# Patient Record
Sex: MALE | Race: BLACK OR AFRICAN AMERICAN | NOT HISPANIC OR LATINO | Employment: FULL TIME | URBAN - METROPOLITAN AREA
[De-identification: names, ages, dates, MRNs, and addresses within clinical notes are randomized per-mention and may not be internally consistent; named-entity substitution may affect disease eponyms.]

---

## 2020-06-24 ENCOUNTER — APPOINTMENT (OUTPATIENT)
Dept: RADIOLOGY | Facility: MEDICAL CENTER | Age: 25
End: 2020-06-24
Attending: EMERGENCY MEDICINE
Payer: COMMERCIAL

## 2020-06-24 ENCOUNTER — HOSPITAL ENCOUNTER (EMERGENCY)
Facility: MEDICAL CENTER | Age: 25
End: 2020-06-24
Attending: EMERGENCY MEDICINE
Payer: COMMERCIAL

## 2020-06-24 VITALS
OXYGEN SATURATION: 97 % | BODY MASS INDEX: 21.98 KG/M2 | RESPIRATION RATE: 19 BRPM | SYSTOLIC BLOOD PRESSURE: 116 MMHG | WEIGHT: 145 LBS | DIASTOLIC BLOOD PRESSURE: 70 MMHG | TEMPERATURE: 99.1 F | HEART RATE: 71 BPM | HEIGHT: 68 IN

## 2020-06-24 DIAGNOSIS — T18.9XXA SWALLOWED FOREIGN BODY, INITIAL ENCOUNTER: ICD-10-CM

## 2020-06-24 PROCEDURE — 71045 X-RAY EXAM CHEST 1 VIEW: CPT

## 2020-06-24 PROCEDURE — 74019 RADEX ABDOMEN 2 VIEWS: CPT

## 2020-06-24 PROCEDURE — 74176 CT ABD & PELVIS W/O CONTRAST: CPT

## 2020-06-24 PROCEDURE — 700101 HCHG RX REV CODE 250: Performed by: EMERGENCY MEDICINE

## 2020-06-24 PROCEDURE — 99284 EMERGENCY DEPT VISIT MOD MDM: CPT

## 2020-06-24 RX ADMIN — POLYETHYLENE GLYCOL 3350, SODIUM SULFATE ANHYDROUS, SODIUM BICARBONATE, SODIUM CHLORIDE, POTASSIUM CHLORIDE 4 L: 236; 22.74; 6.74; 5.86; 2.97 POWDER, FOR SOLUTION ORAL at 20:15

## 2020-06-24 ASSESSMENT — LIFESTYLE VARIABLES: DO YOU DRINK ALCOHOL: NO

## 2020-06-24 NOTE — ED TRIAGE NOTES
Brought in from [facility] after swallowing 2 razor blades around 1400. Pt states had thought of harming himself when he did it - no thoughts prior or after swallowing disposable  razor blades. Pt denies any pain, no n/v. Pt appears in no distress.

## 2020-06-25 NOTE — ED PROVIDER NOTES
ED Provider Note    CHIEF COMPLAINT  Chief Complaint   Patient presents with   • Swallowed Foreign Body       HPI  Karli Hall is a 25 y.o. male who presents to the emergency department via EMS with guards stating he is in retirement, he swallowed to razor blades coat and plastic wrapped in toilet paper.  X-rays in the facility there did reveal evidence of the major bleeds in the gastrum.  The patient has fever, shakes, chills, sweats, nausea, vomiting, rectal bleeding or abdominal pain.  REVIEW OF SYSTEMS  Positives as above. Pertinent negatives include all pain, rectal bleeding all other review of systems are negative    PAST MEDICAL HISTORY  History reviewed. No pertinent past medical history.    FAMILY HISTORY  Noncontributory    SOCIAL HISTORY  Social History     Socioeconomic History   • Marital status: Single     Spouse name: Not on file   • Number of children: Not on file   • Years of education: Not on file   • Highest education level: Not on file   Occupational History   • Not on file   Social Needs   • Financial resource strain: Not on file   • Food insecurity     Worry: Not on file     Inability: Not on file   • Transportation needs     Medical: Not on file     Non-medical: Not on file   Tobacco Use   • Smoking status: Unknown If Ever Smoked   Substance and Sexual Activity   • Alcohol use: Not Currently   • Drug use: Not Currently   • Sexual activity: Not on file   Lifestyle   • Physical activity     Days per week: Not on file     Minutes per session: Not on file   • Stress: Not on file   Relationships   • Social connections     Talks on phone: Not on file     Gets together: Not on file     Attends [employer] service: Not on file     Active member of club or organization: Not on file     Attends meetings of clubs or organizations: Not on file     Relationship status: Not on file   • Intimate partner violence     Fear of current or ex partner: Not on file     Emotionally abused: Not on file     Physically  "abused: Not on file     Forced sexual activity: Not on file   Other Topics Concern   • Not on file   Social History Narrative   • Not on file       SURGICAL HISTORY  History reviewed. No pertinent surgical history.    CURRENT MEDICATIONS  Home Medications     Reviewed by Gabriela López R.N. (Registered Nurse) on 06/24/20 at 1639  Med List Status: <None>   Medication Last Dose Status        Patient Kunal Taking any Medications                       ALLERGIES  Not on File    PHYSICAL EXAM  VITAL SIGNS: /67   Pulse 62   Temp 37.3 °C (99.1 °F) (Temporal)   Resp 18   Ht 1.727 m (5' 8\")   Wt 65.8 kg (145 lb)   SpO2 97%   BMI 22.05 kg/m²      Constitutional: Well developed, Well nourished, No acute distress, Non-toxic appearance.   Cardiovascular: Normal heart rate, Normal rhythm, No murmurs, No rubs, No gallops, and intact distal pulses.   Thorax & Lungs:  No respiratory distress, no rales, no rhonchi, No wheezing, No chest wall tenderness.   Abdomen: Bowel sounds normal, Soft, No tenderness, No guarding, No rebound, No pulsatile masses.   Skin: Warm, Dry, No erythema, No rash.   Extremities: Full range of motion, no deformity, no edema.  Neurologic: Alert & oriented x 3, No focal deficits noted, acting appropriately on exam.  Psychiatric: Affect normal for clinical presentation.      RADIOLOGY/PROCEDURES  Did review the x-ray results that revealed evidence of foreign body in the left gastric region    CT-ABDOMEN-PELVIS W/O   Final Result      1.  2 metallic foreign bodies appear to be present in the left lower quadrant possibly within a loop of small bowel.      2.  No free air or free fluid is identified at this time.      [id number] VIEWS   Final Result      1.  Small linear density projects over the mid abdomen on lateral view, likely artifact related to clothing although ingested foreign body is difficult to entirely exclude.   2.  No evidence of bowel obstruction or pneumoperitoneum.    "   DX-CHEST-LIMITED (1 VIEW)   Final Result      1.  No acute cardiopulmonary disease.   2.  No radiopaque foreign body demonstrated.          COURSE & MEDICAL DECISION MAKING  Pertinent Labs & Imaging studies reviewed. (See chart for details)  This is a 25-year-old gentleman who swallowed plastic encased razor blades and toilet paper.  The x-rays were negative but the CT scan did reveal evidence of the foreign body within the abdomen and probably the small bowel.  No active perforation or abnormality.  The patient has a soft, nontender abdomen, did pass the gastrum.  I discussed the patient Dr. Barr with GI who asked the patient to have a gallon of GoLYTELY and to have them check a stool and repeat x-rays as needed for passing of the razor blades.  In addition the patient had evidence of severe pain, rectal bleeding or fever to return the emergency department for further evaluation and management.  The patient is discharged with GoLYTELY and I have given instructions for the present staff to assess him appropriately.  Strict return precautions have been given    New Prescriptions    No medications on file       FINAL IMPRESSION     1. Swallowed foreign body, initial encounter Active         DISPOSITION:  Patient will be discharged home in stable condition.    FOLLOW UP:  [facility], Emergency Dept  [address] [facility] [address] [phone]    If symptoms worsen           Electronically signed by: Ayan Ariza D.O., 6/24/2020 7:53 PM

## 2020-06-25 NOTE — DISCHARGE INSTRUCTIONS
You have swallowed razor blades.  This point we believe they are in her small bowel.  Please drink all the GoLYTELY over a 2-hour we will start having copious stool out.  Please check your for the foreign body.  Return to the emergency department you have severe pain, vomiting, rectal bleeding as you may have an obstruction or laceration/perforation.  Please perform x-rays intermittently every 8 hours for evaluation of possible passing of the foreign body.

## 2020-06-25 NOTE — ED NOTES
Patient and guards educated on discharge instructions, medications and follow up care. Patient verbalized understanding. Guards given discharge paperwork and medications